# Patient Record
Sex: MALE | Race: WHITE | NOT HISPANIC OR LATINO | Employment: OTHER | URBAN - METROPOLITAN AREA
[De-identification: names, ages, dates, MRNs, and addresses within clinical notes are randomized per-mention and may not be internally consistent; named-entity substitution may affect disease eponyms.]

---

## 2017-06-13 ENCOUNTER — LAB VISIT (OUTPATIENT)
Dept: LAB | Facility: HOSPITAL | Age: 82
End: 2017-06-13
Attending: INTERNAL MEDICINE
Payer: MEDICARE

## 2017-06-13 DIAGNOSIS — R80.9 PROTEINURIA: ICD-10-CM

## 2017-06-13 DIAGNOSIS — D63.1 ANEMIA IN CHRONIC KIDNEY DISEASE(285.21): ICD-10-CM

## 2017-06-13 DIAGNOSIS — I10 ESSENTIAL HYPERTENSION, MALIGNANT: ICD-10-CM

## 2017-06-13 DIAGNOSIS — N18.9 ANEMIA IN CHRONIC KIDNEY DISEASE(285.21): ICD-10-CM

## 2017-06-13 DIAGNOSIS — E03.9 UNSPECIFIED HYPOTHYROIDISM: ICD-10-CM

## 2017-06-13 DIAGNOSIS — N18.30 CHRONIC KIDNEY DISEASE, STAGE III (MODERATE): Primary | ICD-10-CM

## 2017-06-13 DIAGNOSIS — R31.21 ASYMPTOMATIC MICROSCOPIC HEMATURIA: ICD-10-CM

## 2017-06-13 DIAGNOSIS — E87.20 ACIDOSIS: ICD-10-CM

## 2017-06-13 DIAGNOSIS — N18.30 CHRONIC KIDNEY DISEASE, STAGE III (MODERATE): ICD-10-CM

## 2017-06-13 LAB
ALBUMIN SERPL BCP-MCNC: 3.7 G/DL
ALP SERPL-CCNC: 75 U/L
ALT SERPL W/O P-5'-P-CCNC: 16 U/L
AMORPH CRY URNS QL MICRO: ABNORMAL
ANION GAP SERPL CALC-SCNC: 9 MMOL/L
AST SERPL-CCNC: 21 U/L
BACTERIA #/AREA URNS HPF: ABNORMAL /HPF
BASOPHILS # BLD AUTO: 0.05 K/UL
BASOPHILS NFR BLD: 0.6 %
BILIRUB SERPL-MCNC: 0.8 MG/DL
BILIRUB UR QL STRIP: NEGATIVE
BUN SERPL-MCNC: 33 MG/DL
CALCIUM SERPL-MCNC: 9.3 MG/DL
CHLORIDE SERPL-SCNC: 107 MMOL/L
CLARITY UR: CLEAR
CO2 SERPL-SCNC: 23 MMOL/L
COLOR UR: YELLOW
CREAT SERPL-MCNC: 1.7 MG/DL
CREAT UR-MCNC: 111 MG/DL
DIFFERENTIAL METHOD: ABNORMAL
EOSINOPHIL # BLD AUTO: 0.2 K/UL
EOSINOPHIL NFR BLD: 3 %
ERYTHROCYTE [DISTWIDTH] IN BLOOD BY AUTOMATED COUNT: 14.1 %
EST. GFR  (AFRICAN AMERICAN): 41.3 ML/MIN/1.73 M^2
EST. GFR  (NON AFRICAN AMERICAN): 35.7 ML/MIN/1.73 M^2
FERRITIN SERPL-MCNC: 862 NG/ML
GLUCOSE SERPL-MCNC: 145 MG/DL
GLUCOSE UR QL STRIP: NEGATIVE
HCT VFR BLD AUTO: 41.3 %
HGB BLD-MCNC: 13.6 G/DL
HGB UR QL STRIP: NEGATIVE
HYALINE CASTS #/AREA URNS LPF: 2 /LPF
IRON SERPL-MCNC: 64 UG/DL
KETONES UR QL STRIP: NEGATIVE
LEUKOCYTE ESTERASE UR QL STRIP: NEGATIVE
LYMPHOCYTES # BLD AUTO: 2.9 K/UL
LYMPHOCYTES NFR BLD: 35.3 %
MCH RBC QN AUTO: 30.2 PG
MCHC RBC AUTO-ENTMCNC: 32.9 %
MCV RBC AUTO: 92 FL
MICROSCOPIC COMMENT: ABNORMAL
MONOCYTES # BLD AUTO: 0.7 K/UL
MONOCYTES NFR BLD: 8.2 %
NEUTROPHILS # BLD AUTO: 4.2 K/UL
NEUTROPHILS NFR BLD: 52.4 %
NITRITE UR QL STRIP: NEGATIVE
PH UR STRIP: 6 [PH] (ref 5–8)
PHOSPHATE SERPL-MCNC: 3.3 MG/DL
PLATELET # BLD AUTO: 179 K/UL
PMV BLD AUTO: 12 FL
POTASSIUM SERPL-SCNC: 4.8 MMOL/L
PROT SERPL-MCNC: 7.5 G/DL
PROT UR QL STRIP: ABNORMAL
PROT UR-MCNC: 76 MG/DL
PROT/CREAT RATIO, UR: 0.68
PTH-INTACT SERPL-MCNC: 80 PG/ML
RBC # BLD AUTO: 4.51 M/UL
RBC #/AREA URNS HPF: 3 /HPF (ref 0–4)
SATURATED IRON: 21 %
SODIUM SERPL-SCNC: 139 MMOL/L
SP GR UR STRIP: 1.01 (ref 1–1.03)
SQUAMOUS #/AREA URNS HPF: 2 /HPF
TOTAL IRON BINDING CAPACITY: 305 UG/DL
TRANSFERRIN SERPL-MCNC: 206 MG/DL
URN SPEC COLLECT METH UR: ABNORMAL
WBC # BLD AUTO: 8.08 K/UL
WBC #/AREA URNS HPF: 1 /HPF (ref 0–5)

## 2017-06-13 PROCEDURE — 36415 COLL VENOUS BLD VENIPUNCTURE: CPT | Mod: PO

## 2017-06-13 PROCEDURE — 83970 ASSAY OF PARATHORMONE: CPT

## 2017-06-13 PROCEDURE — 82570 ASSAY OF URINE CREATININE: CPT

## 2017-06-13 PROCEDURE — 81000 URINALYSIS NONAUTO W/SCOPE: CPT | Mod: PO

## 2017-06-13 PROCEDURE — 82728 ASSAY OF FERRITIN: CPT

## 2017-06-13 PROCEDURE — 83540 ASSAY OF IRON: CPT

## 2017-06-13 PROCEDURE — 84100 ASSAY OF PHOSPHORUS: CPT

## 2017-06-13 PROCEDURE — 85025 COMPLETE CBC W/AUTO DIFF WBC: CPT

## 2017-06-13 PROCEDURE — 80053 COMPREHEN METABOLIC PANEL: CPT

## 2019-01-22 ENCOUNTER — OFFICE VISIT (OUTPATIENT)
Dept: VASCULAR SURGERY | Facility: CLINIC | Age: 84
End: 2019-01-22
Payer: MEDICARE

## 2019-01-22 VITALS
BODY MASS INDEX: 32.13 KG/M2 | SYSTOLIC BLOOD PRESSURE: 178 MMHG | DIASTOLIC BLOOD PRESSURE: 81 MMHG | HEIGHT: 68 IN | HEART RATE: 66 BPM | WEIGHT: 212 LBS

## 2019-01-22 DIAGNOSIS — R09.89 LEFT CAROTID BRUIT: ICD-10-CM

## 2019-01-22 DIAGNOSIS — I65.23 CAROTID STENOSIS, BILATERAL: ICD-10-CM

## 2019-01-22 PROCEDURE — 99205 PR OFFICE/OUTPT VISIT, NEW, LEVL V, 60-74 MIN: ICD-10-PCS | Mod: S$PBB,,, | Performed by: THORACIC SURGERY (CARDIOTHORACIC VASCULAR SURGERY)

## 2019-01-22 PROCEDURE — 99205 OFFICE O/P NEW HI 60 MIN: CPT | Mod: S$PBB,,, | Performed by: THORACIC SURGERY (CARDIOTHORACIC VASCULAR SURGERY)

## 2019-01-22 PROCEDURE — 99999 PR PBB SHADOW E&M-EST. PATIENT-LVL III: ICD-10-PCS | Mod: PBBFAC,,, | Performed by: THORACIC SURGERY (CARDIOTHORACIC VASCULAR SURGERY)

## 2019-01-22 PROCEDURE — 99999 PR PBB SHADOW E&M-EST. PATIENT-LVL III: CPT | Mod: PBBFAC,,, | Performed by: THORACIC SURGERY (CARDIOTHORACIC VASCULAR SURGERY)

## 2019-01-22 PROCEDURE — 99213 OFFICE O/P EST LOW 20 MIN: CPT | Mod: PBBFAC,PO | Performed by: THORACIC SURGERY (CARDIOTHORACIC VASCULAR SURGERY)

## 2019-01-27 PROBLEM — R09.89 LEFT CAROTID BRUIT: Status: ACTIVE | Noted: 2019-01-27

## 2019-01-27 PROBLEM — I65.23 CAROTID STENOSIS, BILATERAL: Status: ACTIVE | Noted: 2019-01-27

## 2019-01-27 NOTE — PROGRESS NOTES
CHIEF COMPLAINT:   Chief Complaint   Patient presents with    Carotid Artery Disease     Mauro:Carotid Stenosis    Coronary Artery Disease       REFERRING PROVIDER: Self, Aaareferral    Reason for consult: Evaluation of carotid artery disease    Subjective:     Patient is a 87 y.o. male who is followed by his primary care physician and noted to have a carotid bruit. Carotid ultrasound was performed with abnormal findings. Patient denies stroke, amaurosis fugax, or lateralizing neurologic complaints.    Patient Active Problem List    Diagnosis Date Noted    Atrial fibrillation 11/08/2013    Hx of CABG 11/08/2013    PVD (peripheral vascular disease) 03/27/2013    Lumbar spinal stenosis     Diabetes mellitus, type 2     HTN (hypertension)     Renal insufficiency     CAD (coronary artery disease)     HLD (hyperlipidemia)     Hypothyroidism      Past Medical History:   Diagnosis Date    CAD (coronary artery disease)     Carotid artery occlusion     Diabetes mellitus type II     Gout     HLD (hyperlipidemia)     HTN (hypertension)     Hypothyroidism     Lumbar spinal stenosis     Renal insufficiency       Past Surgical History:   Procedure Laterality Date    CARDIAC CATHETERIZATION      CORONARY ARTERY BYPASS GRAFT  09/29/2005    JOINT REPLACEMENT      knee    SPINE SURGERY      stents           Medication List           Accurate as of 1/22/19 11:59 PM. If you have any questions, ask your nurse or doctor.               CONTINUE taking these medications    amLODIPine 10 MG tablet  Commonly known as:  NORVASC     aspirin 81 MG EC tablet  Commonly known as:  ECOTRIN     carvedilol 3.125 MG tablet  Commonly known as:  COREG     ELIQUIS 2.5 mg Tab  Generic drug:  apixaban     glipiZIDE 5 MG tablet  Commonly known as:  GLUCOTROL  Take 1 tablet (5 mg total) by mouth 2 (two) times daily.     hydroCHLOROthiazide 25 MG tablet  Commonly known as:  HYDRODIURIL     levothyroxine 100 MCG tablet  Commonly  "known as:  SYNTHROID  TAKE 1 TABLET BY MOUTH EVERY DAY     losartan 100 MG tablet  Commonly known as:  COZAAR     pantoprazole 40 MG tablet  Commonly known as:  PROTONIX     prednisoLONE acetate 1 % Drps  Commonly known as:  PRED FORTE     VITAMIN D-3 ORAL     ZETIA 10 mg tablet  Generic drug:  ezetimibe  TAKE 1 TABLET BY MOUTH EVERY DAY          Review of patient's allergies indicates:   Allergen Reactions    No known drug allergies       Social History     Tobacco Use    Smoking status: Never Smoker    Smokeless tobacco: Former User     Types: Chew   Substance Use Topics    Alcohol use: Yes     Comment: ocasionally      Family History   Problem Relation Age of Onset    Heart disease Mother     Hypertension Son     Aneurysm Brother     Diabetes Son       Review of Systems  Gen.: No fevers, chills, night sweats, weight changes.  HEENT: No new visual field changes or hoarseness. No amaurosis fugax  Neck: No complaints.  Respiratory: No significant shortness of breath, cough, hemoptysis.  Heart: No angina or palpitations.  GI: patient melena.  : No dysuria.  Skin: No rash.  Fascial: No claudication.  Neurologic: No lateralizing complaints.    Objective: Physical exam      Vitals:    01/22/19 1326   BP: (!) 178/81   Pulse: 66   Weight: 96.2 kg (212 lb)   Height: 5' 8" (1.727 m)   PainSc: 0-No pain       General: Heavyset, pleasant man in no obvious distress.  HEENT: Normocephalic, atraumatic. There is good facial symmetry. Tongue protrudes in the midline.  Neck: Trachea is midline. There is a soft left carotid bruit.  Chest: Median sternotomy scar is present.  Heart: Regular rate and rhythm.  Lungs: Clear bilaterally.  Abdomen: Normal bowel sounds.  Extremities: No cyanosis or clubbing.  Vascular: 2+ radial and carotid bilaterally.  Neurologic: Alert and oriented ×4 with no focal deficits.    Data Review:   Lab Results   Component Value Date    WBC 8.08 06/13/2017    HGB 13.6 (L) 06/13/2017    HCT 41.3 " 06/13/2017    MCV 92 06/13/2017     06/13/2017   ,   BMP   Lab Results   Component Value Date     06/13/2017    K 4.8 06/13/2017     06/13/2017    CO2 23 06/13/2017    BUN 33 (H) 06/13/2017    CREATININE 1.7 (H) 06/13/2017    CALCIUM 9.3 06/13/2017    ANIONGAP 9 06/13/2017    ESTGFRAFRICA 41.3 (A) 06/13/2017    EGFRNONAA 35.7 (A) 06/13/2017   ,   CMP  Sodium   Date Value Ref Range Status   06/13/2017 139 136 - 145 mmol/L Final     Potassium   Date Value Ref Range Status   06/13/2017 4.8 3.5 - 5.1 mmol/L Final     Chloride   Date Value Ref Range Status   06/13/2017 107 95 - 110 mmol/L Final     CO2   Date Value Ref Range Status   06/13/2017 23 23 - 29 mmol/L Final     Glucose   Date Value Ref Range Status   06/13/2017 145 (H) 70 - 110 mg/dL Final     BUN, Bld   Date Value Ref Range Status   06/13/2017 33 (H) 8 - 23 mg/dL Final     Creatinine   Date Value Ref Range Status   06/13/2017 1.7 (H) 0.5 - 1.4 mg/dL Final     Calcium   Date Value Ref Range Status   06/13/2017 9.3 8.7 - 10.5 mg/dL Final     Total Protein   Date Value Ref Range Status   06/13/2017 7.5 6.0 - 8.4 g/dL Final     Albumin   Date Value Ref Range Status   06/13/2017 3.7 3.5 - 5.2 g/dL Final     Total Bilirubin   Date Value Ref Range Status   06/13/2017 0.8 0.1 - 1.0 mg/dL Final     Comment:     For infants and newborns, interpretation of results should be based  on gestational age, weight and in agreement with clinical  observations.  Premature Infant recommended reference ranges:  Up to 24 hours.............<8.0 mg/dL  Up to 48 hours............<12.0 mg/dL  3-5 days..................<15.0 mg/dL  6-29 days.................<15.0 mg/dL       Alkaline Phosphatase   Date Value Ref Range Status   06/13/2017 75 55 - 135 U/L Final     AST   Date Value Ref Range Status   06/13/2017 21 10 - 40 U/L Final     ALT   Date Value Ref Range Status   06/13/2017 16 10 - 44 U/L Final     Anion Gap   Date Value Ref Range Status   06/13/2017 9 8 - 16  mmol/L Final     eGFR if    Date Value Ref Range Status   06/13/2017 41.3 (A) >60 mL/min/1.73 m^2 Final     eGFR if non    Date Value Ref Range Status   06/13/2017 35.7 (A) >60 mL/min/1.73 m^2 Final     Comment:     Calculation used to obtain the estimated glomerular filtration  rate (eGFR) is the CKD-EPI equation. Since race is unknown   in our information system, the eGFR values for   -American and Non--American patients are given   for each creatinine result.     ,   Lab Results   Component Value Date    INR 1.1 08/22/2012    INR 1.1 12/10/2010    INR 1.0 11/10/2010       EKG:      Chest X-Ray: No results found for this or any previous visit.    CT Scan: No results found for this or any previous visit.      Carotid ultrasound: [facility].  1/4/2019.  Right carotid findings:  There is moderate to severe atheromatous plaquing which shadowing from calcified plaque.  Proximal internal carotid peak systolic velocity 111.3. Peak systolic ratio 1.9.  Left carotid findings:  There is moderate to severe atheromatous plaquing with shadowing from calcified plaque.  Proximal internal carotid artery peak systolic velocity 161 with a peak systolic ratio 1.7.    Impression: Bilateral moderate to severe carotid atheromatous plaquing. There is less than 50% diameter reduction of the right side. There is 50-69% diameter reduction of the left side.        Assessment:     1. Moderate left internal carotid artery stenosis, asymptomatic.  2. Left carotid bruit related to above.  3. History coronary artery disease with previous coronary artery bypass.    Plan:     Recommend that the patient continue current medical management.  He should follow in 6 months with carotid ultrasound.  Should he develop any symptoms of amaurosis fugax or lateralizing neurologic complaints, he will notify me immediately, and CT angiography of the carotids will be performed.

## 2019-07-15 DIAGNOSIS — R09.89 LEFT CAROTID BRUIT: ICD-10-CM

## 2019-07-15 DIAGNOSIS — I65.23 CAROTID STENOSIS, BILATERAL: Primary | ICD-10-CM

## 2020-02-19 ENCOUNTER — TELEPHONE (OUTPATIENT)
Dept: RADIOLOGY | Facility: HOSPITAL | Age: 85
End: 2020-02-19

## 2020-02-20 ENCOUNTER — HOSPITAL ENCOUNTER (OUTPATIENT)
Dept: RADIOLOGY | Facility: HOSPITAL | Age: 85
Discharge: HOME | End: 2020-02-20
Attending: THORACIC SURGERY (CARDIOTHORACIC VASCULAR SURGERY)
Payer: MEDICARE

## 2020-02-20 DIAGNOSIS — I65.23 CAROTID STENOSIS, BILATERAL: ICD-10-CM

## 2020-02-20 DIAGNOSIS — R09.89 LEFT CAROTID BRUIT: ICD-10-CM

## 2020-02-20 PROCEDURE — 93880 US CAROTID BILATERAL: ICD-10-PCS | Mod: 26,,, | Performed by: RADIOLOGY

## 2020-02-20 PROCEDURE — 93880 EXTRACRANIAL BILAT STUDY: CPT | Mod: 26,,, | Performed by: RADIOLOGY

## 2020-02-20 PROCEDURE — 93880 EXTRACRANIAL BILAT STUDY: CPT | Mod: TC,PO

## 2020-02-28 ENCOUNTER — TELEPHONE (OUTPATIENT)
Dept: VASCULAR SURGERY | Facility: CLINIC | Age: 85
End: 2020-02-28

## 2020-02-28 NOTE — TELEPHONE ENCOUNTER
----- Message from Perla Mead sent at 2/28/2020  2:27 PM CST -----  Contact: Patient  Patient would like to receive a phone call in reference to his Bilateral Carotid Ultrasound results from 02/20/2020.  [facility]

## 2020-03-02 NOTE — TELEPHONE ENCOUNTER
----- Message from Larissa Haque sent at 3/2/2020 11:15 AM CST -----  Contact: Kurt-rema  Type:  Sooner Apoointment Request    Caller is requesting a sooner appointment.  Caller declined first available appointment listed below.  Caller will not accept being placed on the waitlist and is requesting a message be sent to doctor.  Name of Caller:Mayur-son  When is the first available appointment?03/31/2020  Symptoms: US results//face feeling stiff  Would the patient rather a call back or a response via [facility]? call  Best Call Back Number:[phone] or [phone] (Kurt-son)  Additional Information:

## 2020-03-03 ENCOUNTER — OFFICE VISIT (OUTPATIENT)
Dept: CARDIAC SURGERY | Facility: CLINIC | Age: 85
End: 2020-03-03
Payer: MEDICARE

## 2020-03-03 ENCOUNTER — TELEPHONE (OUTPATIENT)
Dept: VASCULAR SURGERY | Facility: CLINIC | Age: 85
End: 2020-03-03

## 2020-03-03 VITALS
DIASTOLIC BLOOD PRESSURE: 73 MMHG | RESPIRATION RATE: 18 BRPM | HEIGHT: 68 IN | HEART RATE: 62 BPM | WEIGHT: 210 LBS | BODY MASS INDEX: 31.83 KG/M2 | SYSTOLIC BLOOD PRESSURE: 177 MMHG

## 2020-03-03 DIAGNOSIS — R09.89 LEFT CAROTID BRUIT: ICD-10-CM

## 2020-03-03 DIAGNOSIS — I65.23 CAROTID STENOSIS, BILATERAL: Primary | ICD-10-CM

## 2020-03-03 PROCEDURE — 99214 OFFICE O/P EST MOD 30 MIN: CPT | Mod: PBBFAC,PO | Performed by: THORACIC SURGERY (CARDIOTHORACIC VASCULAR SURGERY)

## 2020-03-03 PROCEDURE — 99213 OFFICE O/P EST LOW 20 MIN: CPT | Mod: S$PBB,,, | Performed by: THORACIC SURGERY (CARDIOTHORACIC VASCULAR SURGERY)

## 2020-03-03 PROCEDURE — 99213 PR OFFICE/OUTPT VISIT, EST, LEVL III, 20-29 MIN: ICD-10-PCS | Mod: S$PBB,,, | Performed by: THORACIC SURGERY (CARDIOTHORACIC VASCULAR SURGERY)

## 2020-03-03 PROCEDURE — 99999 PR PBB SHADOW E&M-EST. PATIENT-LVL IV: CPT | Mod: PBBFAC,,, | Performed by: THORACIC SURGERY (CARDIOTHORACIC VASCULAR SURGERY)

## 2020-03-03 PROCEDURE — 99999 PR PBB SHADOW E&M-EST. PATIENT-LVL IV: ICD-10-PCS | Mod: PBBFAC,,, | Performed by: THORACIC SURGERY (CARDIOTHORACIC VASCULAR SURGERY)

## 2020-03-03 NOTE — PROGRESS NOTES
CHIEF COMPLAINT:   Chief Complaint   Patient presents with    Carotid Artery Disease         History of Present Illness     Patient is a 88 y.o. male with history of asymptomatic left carotid stenosis.  He had a recent ultrasound, and he notices that both sides of his face are numb after he awakens from a nap.  This lasts for approximately 10 min and then resolves.  He has no lateralizing neurologic complaints.    Patient Active Problem List    Diagnosis Date Noted    Carotid stenosis, bilateral 01/27/2019    Left carotid bruit 01/27/2019    Atrial fibrillation 11/08/2013    Hx of CABG 11/08/2013    PVD (peripheral vascular disease) 03/27/2013    Lumbar spinal stenosis     Diabetes mellitus, type 2     HTN (hypertension)     Renal insufficiency     CAD (coronary artery disease)     HLD (hyperlipidemia)     Hypothyroidism      Past Medical History:   Diagnosis Date    CAD (coronary artery disease)     Carotid artery occlusion     Carotid stenosis, bilateral 1/27/2019    Diabetes mellitus type II     Gout     HLD (hyperlipidemia)     HTN (hypertension)     Hypothyroidism     Left carotid bruit 1/27/2019    Lumbar spinal stenosis     Renal insufficiency       Past Surgical History:   Procedure Laterality Date    CARDIAC CATHETERIZATION      CORONARY ARTERY BYPASS GRAFT  09/29/2005    JOINT REPLACEMENT      knee    SPINE SURGERY      stents        Medication List with Changes/Refills   Current Medications    AMLODIPINE (NORVASC) 10 MG TABLET    Take 10 mg by mouth once daily. 1 tablet Oral Every evening    APIXABAN (ELIQUIS) 2.5 MG TAB    Take 2.5 mg by mouth 2 (two) times daily.    ASPIRIN (ECOTRIN) 81 MG EC TABLET    Take 81 mg by mouth once daily.    CALCIUM CARBONATE/VITAMIN D3 (VITAMIN D-3 ORAL)    Take by mouth.    CARVEDILOL (COREG) 3.125 MG TABLET    Take 3.125 mg by mouth 2 (two) times daily with meals.    GLIPIZIDE (GLUCOTROL) 5 MG TABLET    Take 1 tablet (5 mg total) by mouth 2  "(two) times daily.    HYDROCHLOROTHIAZIDE (HYDRODIURIL) 25 MG TABLET    Take 25 mg by mouth once daily.    LEVOTHYROXINE (SYNTHROID) 100 MCG TABLET    TAKE 1 TABLET BY MOUTH EVERY DAY    LOSARTAN (COZAAR) 100 MG TABLET    Take 100 mg by mouth once daily.    PANTOPRAZOLE (PROTONIX) 40 MG TABLET    Take 40 mg by mouth once daily.    PREDNISOLONE ACETATE (PRED FORTE) 1 % DRPS    1 drop 2 (two) times daily.    ZETIA 10 MG TABLET    TAKE 1 TABLET BY MOUTH EVERY DAY     Review of patient's allergies indicates:   Allergen Reactions    No known drug allergies       Social History     Tobacco Use    Smoking status: Never Smoker    Smokeless tobacco: Former User     Types: Chew   Substance Use Topics    Alcohol use: Yes     Comment: ocasionally      Family History   Problem Relation Age of Onset    Heart disease Mother     Hypertension Son     Aneurysm Brother     Diabetes Son       Review of Systems  General:  No fevers, chills, night sweats, weight changes.  HEENT:  Patient developed some numbness in both sides of his face after he awakens from a nap.  This has occurred since his recent carotid ultrasound.  There are no lateralizing complaints.  He has no speech difficulty or vision difficulty.  Respiratory:  No shortness of breath.  Cardiac:  Possibly no chest pain although he does have burning pain in the chest at night that considers secondary to reflux disease.  GI:  Possible gastroesophageal reflux symptoms.  Neurologic:  Numbness in his face after a nap.  No lateralizing complaints.    Objective: Physical Exam     Vitals:    03/03/20 1616   BP: (!) 177/73   Pulse: 62   Resp: 18   Weight: 95.3 kg (210 lb)   Height: 5' 8" (1.727 m)   PainSc:   5   PainLoc: Generalized       Objective    General:  Well-nourished, well-developed man in no obvious distress.  HEENT:  Normocephalic, atraumatic.  There is good facial symmetry.  Tongue protrudes in midline.  Neck:  There is a left carotid bruit.  Chest:  Lungs are " clear bilaterally.  Heart:  Regular rate and rhythm.  Extremities:  No significant edema.  Neurologic:  Alert oriented x4 no focal deficits.    Data Review:   Lab Results   Component Value Date    WBC 8.08 06/13/2017    HGB 13.6 (L) 06/13/2017    HCT 41.3 06/13/2017    MCV 92 06/13/2017     06/13/2017   ,   BMP   Lab Results   Component Value Date     06/13/2017    K 4.8 06/13/2017     06/13/2017    CO2 23 06/13/2017    BUN 33 (H) 06/13/2017    CREATININE 1.7 (H) 06/13/2017    CALCIUM 9.3 06/13/2017    ANIONGAP 9 06/13/2017    ESTGFRAFRICA 41.3 (A) 06/13/2017    EGFRNONAA 35.7 (A) 06/13/2017   ,   CMP  Sodium   Date Value Ref Range Status   06/13/2017 139 136 - 145 mmol/L Final     Potassium   Date Value Ref Range Status   06/13/2017 4.8 3.5 - 5.1 mmol/L Final     Chloride   Date Value Ref Range Status   06/13/2017 107 95 - 110 mmol/L Final     CO2   Date Value Ref Range Status   06/13/2017 23 23 - 29 mmol/L Final     Glucose   Date Value Ref Range Status   06/13/2017 145 (H) 70 - 110 mg/dL Final     BUN, Bld   Date Value Ref Range Status   06/13/2017 33 (H) 8 - 23 mg/dL Final     Creatinine   Date Value Ref Range Status   06/13/2017 1.7 (H) 0.5 - 1.4 mg/dL Final     Calcium   Date Value Ref Range Status   06/13/2017 9.3 8.7 - 10.5 mg/dL Final     Total Protein   Date Value Ref Range Status   06/13/2017 7.5 6.0 - 8.4 g/dL Final     Albumin   Date Value Ref Range Status   06/13/2017 3.7 3.5 - 5.2 g/dL Final     Total Bilirubin   Date Value Ref Range Status   06/13/2017 0.8 0.1 - 1.0 mg/dL Final     Comment:     For infants and newborns, interpretation of results should be based  on gestational age, weight and in agreement with clinical  observations.  Premature Infant recommended reference ranges:  Up to 24 hours.............<8.0 mg/dL  Up to 48 hours............<12.0 mg/dL  3-5 days..................<15.0 mg/dL  6-29 days.................<15.0 mg/dL       Alkaline Phosphatase   Date Value Ref  Range Status   06/13/2017 75 55 - 135 U/L Final     AST   Date Value Ref Range Status   06/13/2017 21 10 - 40 U/L Final     ALT   Date Value Ref Range Status   06/13/2017 16 10 - 44 U/L Final     Anion Gap   Date Value Ref Range Status   06/13/2017 9 8 - 16 mmol/L Final     eGFR if    Date Value Ref Range Status   06/13/2017 41.3 (A) >60 mL/min/1.73 m^2 Final     eGFR if non    Date Value Ref Range Status   06/13/2017 35.7 (A) >60 mL/min/1.73 m^2 Final     Comment:     Calculation used to obtain the estimated glomerular filtration  rate (eGFR) is the CKD-EPI equation. Since race is unknown   in our information system, the eGFR values for   -American and Non--American patients are given   for each creatinine result.     ,   Lab Results   Component Value Date    INR 1.1 08/22/2012    INR 1.1 12/10/2010    INR 1.0 11/10/2010       US Carotid Bilateral1/4/2019  [facility]  Result Narrative   REASON FOR EXAM: [R09.89]-Other specified symptoms and signs involving the circulatory and respiratory systems      TECHNICAL FACTORS: Using a linear high-frequency vascular ultrasound transducer, transverse and longitudinal gray-scale images are obtained of the extracranial carotid system. B-mode, color Doppler and spectral Doppler images of the CCA, ICA, ECA, and   vertebral arteries are included.    TECHNOLOGIST: Jeramy Vasques    COMPARISON: None    RIGHT CAROTID FINDINGS:  There is moderate to severe atheromatous plaquing with shadowing from calcified plaque.  ICA Prox PS: 111.31 cm/s   ICA Prox ED: 11.15 cm/s   ICA Mid PS: 86.03 cm/s   ICA Mid ED: 11.82 cm/s   ICA Dist PS: 78.19 cm/s   ICA Dist ED: 9.48 cm/s   CCA Mid PS: 58.18 cm/s   CCA Mid ED: 9.79 cm/s   ECA PS: 247.61 cm/s  Vert PS: 44.41 cm/s  Antegrade flow.   ICA/CCA PS Ratio: 1.9     LEFT CAROTID FINDINGS:  There is moderate to severe atheromatous plaquing with shadowing from calcified plaque.  ICA Prox  PS: 161.30 cm/s   ICA Prox ED: 15.84 cm/s   ICA Mid PS: 87.87 cm/s   ICA Mid ED: 14.15 cm/s   ICA Dist PS: 90.80 cm/s   ICA Dist ED: 5.76 cm/s   CCA Mid PS: 93.95 cm/s   CCA Mid ED: 8.54 cm/s   ECA PS: 283.16 cm/s  Vert PS: 46.69 cm/s  Antegrade flow.   ICA/CCA PS Ratio: 1.7     Comparison of the proximal internal carotid artery diameter to the distal internal carotid artery diameter bilaterally was performed.     IMPRESSION:  Bilateral moderate to severe carotid atheromatous plaquing. There is less than 50% diameter reduction of the right side. There is 50-69% diameter reduction of the left side.    RECOMMENDATIONS: Due to dense plaque calcification consider correlation with CTA of the carotids.      Electronically signed by Mayur Fish MD on 1/4/2019 3:27 PM       US CAROTID BILATERAL  [facility]  Date: 02/20/2020    CLINICAL HISTORY:  Occlusion and stenosis of bilateral carotid arteries    TECHNIQUE:  Real-time grayscale, color and duplex doppler ultrasound examination of the carotid and vertebral artery systems bilaterally.  Stenosis as determined by the Society of Radiologists in Ultrasound consensus.    COMPARISON:  None.    FINDINGS:  The following velocities represent peak systolic velocity and will be measured in cm/sec.    Right:    CCA: 86    ICA: 122    IC/CC ratio: 1.4.    Vertebral artery: Antegrade flow and normal waveform.    There is moderate plaque noted in the region of the right ICA origin/carotid bulb. Velocities suggest a less than 50% narrowing.    Left:    CCA: 114 cm/sec    ICA: 166    IC/CC ratio: 1.5.    Vertebral artery: Antegrade flow and normal waveform.    There is moderate plaque noted in the region of the left ICA origin/carotid bulb. The degree of stenosis is on the order of 50-69%.      Impression       1. Findings suggesting hemodynamically significant stenosis on the left.  .      Electronically signed by: Miguel Cook DO  Date: 02/20/2020  Time: 15:09         Assessment:      1.  Moderate, asymptomatic left internal carotid artery stenosis that has not changed over the last year.  2.  Numbness in his face bilaterally after he awakened from a nap.  I do not believe this was related to his carotid artery disease.  3.  Patient has stopped taking Zantac for his heartburn and remains with heartburn despite taking Prilosec once daily.    Plan:     1.  No intervention is required for his carotid disease.  He should follow in 1 year with carotid ultrasound.  2.  I have recommended the patient take his Prilosec twice daily for 1 week.  If his heartburn symptoms do not resolve, he should notify Dr. Vila, his primary care physician, immediately as his complaints may be related to cardiac issues rather than gastric.

## 2020-03-03 NOTE — TELEPHONE ENCOUNTER
----- Message from Thu Cornejo sent at 3/3/2020 11:09 AM CST -----  Contact: pt  Type:  Patient Returning Call    Who Called:pt  Who Left Message for Patient:nurse  Does the patient know what this is regarding?:  Would the patient rather a call back or a response via [facility]? Call back  Best Call Back Number: [phone]  Additional Information:

## 2021-04-29 ENCOUNTER — TELEPHONE (OUTPATIENT)
Dept: DERMATOLOGY | Facility: CLINIC | Age: 86
End: 2021-04-29

## 2021-05-12 ENCOUNTER — OFFICE VISIT (OUTPATIENT)
Dept: DERMATOLOGY | Facility: CLINIC | Age: 86
End: 2021-05-12
Payer: MEDICARE

## 2021-05-12 VITALS
SYSTOLIC BLOOD PRESSURE: 153 MMHG | BODY MASS INDEX: 30.31 KG/M2 | DIASTOLIC BLOOD PRESSURE: 69 MMHG | HEART RATE: 65 BPM | WEIGHT: 200 LBS | HEIGHT: 68 IN

## 2021-05-12 DIAGNOSIS — C44.111 BASAL CELL CARCINOMA, EYELID, RIGHT: ICD-10-CM

## 2021-05-12 DIAGNOSIS — C44.222 SQUAMOUS CELL CANCER OF EXTERNAL EAR, RIGHT: Primary | ICD-10-CM

## 2021-05-12 PROCEDURE — 99203 PR OFFICE/OUTPT VISIT, NEW, LEVL III, 30-44 MIN: ICD-10-PCS | Mod: S$PBB,,, | Performed by: DERMATOLOGY

## 2021-05-12 PROCEDURE — 99999 PR PBB SHADOW E&M-EST. PATIENT-LVL IV: ICD-10-PCS | Mod: PBBFAC,,, | Performed by: DERMATOLOGY

## 2021-05-12 PROCEDURE — 99214 OFFICE O/P EST MOD 30 MIN: CPT | Mod: PBBFAC,PO | Performed by: DERMATOLOGY

## 2021-05-12 PROCEDURE — 99999 PR PBB SHADOW E&M-EST. PATIENT-LVL IV: CPT | Mod: PBBFAC,,, | Performed by: DERMATOLOGY

## 2021-05-12 PROCEDURE — 99203 OFFICE O/P NEW LOW 30 MIN: CPT | Mod: S$PBB,,, | Performed by: DERMATOLOGY

## 2021-05-12 RX ORDER — ISOSORBIDE MONONITRATE 30 MG/1
30 TABLET, EXTENDED RELEASE ORAL DAILY
COMMUNITY

## 2021-05-12 RX ORDER — CLOPIDOGREL BISULFATE 75 MG/1
75 TABLET ORAL DAILY
COMMUNITY

## 2021-05-12 RX ORDER — NIFEDIPINE 60 MG/1
30 TABLET, EXTENDED RELEASE ORAL DAILY
COMMUNITY

## 2021-05-12 RX ORDER — SACUBITRIL AND VALSARTAN 24; 26 MG/1; MG/1
1 TABLET, FILM COATED ORAL 2 TIMES DAILY
COMMUNITY

## 2021-05-12 RX ORDER — TORSEMIDE 20 MG/1
20 TABLET ORAL DAILY
COMMUNITY

## 2021-05-12 RX ORDER — LANOLIN ALCOHOL/MO/W.PET/CERES
400 CREAM (GRAM) TOPICAL DAILY
COMMUNITY

## 2021-06-08 ENCOUNTER — PROCEDURE VISIT (OUTPATIENT)
Dept: DERMATOLOGY | Facility: CLINIC | Age: 86
End: 2021-06-08
Payer: MEDICARE

## 2021-06-08 VITALS
SYSTOLIC BLOOD PRESSURE: 135 MMHG | HEART RATE: 71 BPM | BODY MASS INDEX: 30.31 KG/M2 | WEIGHT: 200 LBS | HEIGHT: 68 IN | DIASTOLIC BLOOD PRESSURE: 68 MMHG

## 2021-06-08 DIAGNOSIS — C44.222 SQUAMOUS CELL CARCINOMA, EAR, RIGHT: Primary | ICD-10-CM

## 2021-06-08 DIAGNOSIS — D48.5 NEOPLASM OF UNCERTAIN BEHAVIOR OF SKIN: ICD-10-CM

## 2021-06-08 PROCEDURE — 99499 UNLISTED E&M SERVICE: CPT | Mod: S$PBB,,, | Performed by: DERMATOLOGY

## 2021-06-08 PROCEDURE — 11102 TANGNTL BX SKIN SINGLE LES: CPT | Mod: 59,PBBFAC,PO | Performed by: DERMATOLOGY

## 2021-06-08 PROCEDURE — 17312 MOHS ADDL STAGE: CPT | Mod: S$PBB,,, | Performed by: DERMATOLOGY

## 2021-06-08 PROCEDURE — 99499 NO LOS: ICD-10-PCS | Mod: S$PBB,,, | Performed by: DERMATOLOGY

## 2021-06-08 PROCEDURE — 11103 PR TANGENTIAL BIOPSY, SKIN, EA ADDTL LESION: ICD-10-PCS | Mod: S$PBB,,, | Performed by: DERMATOLOGY

## 2021-06-08 PROCEDURE — 88305 TISSUE EXAM BY PATHOLOGIST: ICD-10-PCS | Mod: 26,,, | Performed by: PATHOLOGY

## 2021-06-08 PROCEDURE — 11102 PR TANGENTIAL BIOPSY, SKIN, SINGLE LESION: ICD-10-PCS | Mod: S$PBB,59,, | Performed by: DERMATOLOGY

## 2021-06-08 PROCEDURE — 88342 IMHCHEM/IMCYTCHM 1ST ANTB: CPT | Mod: 26,,, | Performed by: PATHOLOGY

## 2021-06-08 PROCEDURE — 88342 CHG IMMUNOCYTOCHEMISTRY: ICD-10-PCS | Mod: 26,,, | Performed by: PATHOLOGY

## 2021-06-08 PROCEDURE — 15260 PR FULL THICK GRFT NOS,EAR,LID <20 SQCM: ICD-10-PCS | Mod: S$PBB,,, | Performed by: DERMATOLOGY

## 2021-06-08 PROCEDURE — 88305 TISSUE EXAM BY PATHOLOGIST: CPT | Mod: 26,,, | Performed by: PATHOLOGY

## 2021-06-08 PROCEDURE — 17311 MOHS 1 STAGE H/N/HF/G: CPT | Mod: S$PBB,,, | Performed by: DERMATOLOGY

## 2021-06-08 PROCEDURE — 11106 INCAL BX SKN SINGLE LES: CPT | Mod: PBBFAC,PO | Performed by: DERMATOLOGY

## 2021-06-08 PROCEDURE — 15260 FTH/GFT FR N/E/E/L 20 SQCM/<: CPT | Mod: S$PBB,,, | Performed by: DERMATOLOGY

## 2021-06-08 PROCEDURE — 17311 MOHS 1 STAGE H/N/HF/G: CPT | Mod: PBBFAC,PO,59 | Performed by: DERMATOLOGY

## 2021-06-08 PROCEDURE — 17312: ICD-10-PCS | Mod: S$PBB,,, | Performed by: DERMATOLOGY

## 2021-06-08 PROCEDURE — 11103 TANGNTL BX SKIN EA SEP/ADDL: CPT | Mod: S$PBB,,, | Performed by: DERMATOLOGY

## 2021-06-08 PROCEDURE — 11102 TANGNTL BX SKIN SINGLE LES: CPT | Mod: S$PBB,59,, | Performed by: DERMATOLOGY

## 2021-06-08 PROCEDURE — 15260 FTH/GFT FR N/E/E/L 20 SQCM/<: CPT | Mod: PBBFAC,PO,59 | Performed by: DERMATOLOGY

## 2021-06-08 PROCEDURE — 88342 IMHCHEM/IMCYTCHM 1ST ANTB: CPT | Performed by: PATHOLOGY

## 2021-06-08 PROCEDURE — 88305 TISSUE EXAM BY PATHOLOGIST: CPT | Performed by: PATHOLOGY

## 2021-06-08 PROCEDURE — 17312 MOHS ADDL STAGE: CPT | Mod: PBBFAC,PO | Performed by: DERMATOLOGY

## 2021-06-08 PROCEDURE — 17311: ICD-10-PCS | Mod: S$PBB,,, | Performed by: DERMATOLOGY

## 2021-06-15 ENCOUNTER — OFFICE VISIT (OUTPATIENT)
Dept: DERMATOLOGY | Facility: CLINIC | Age: 86
End: 2021-06-15
Payer: MEDICARE

## 2021-06-15 DIAGNOSIS — Z48.02 VISIT FOR SUTURE REMOVAL: Primary | ICD-10-CM

## 2021-06-15 PROCEDURE — 99024 PR POST-OP FOLLOW-UP VISIT: ICD-10-PCS | Mod: POP,,, | Performed by: DERMATOLOGY

## 2021-06-15 PROCEDURE — 99999 PR PBB SHADOW E&M-EST. PATIENT-LVL III: CPT | Mod: PBBFAC,,, | Performed by: DERMATOLOGY

## 2021-06-15 PROCEDURE — 99213 OFFICE O/P EST LOW 20 MIN: CPT | Mod: PBBFAC,PO | Performed by: DERMATOLOGY

## 2021-06-15 PROCEDURE — 99024 POSTOP FOLLOW-UP VISIT: CPT | Mod: POP,,, | Performed by: DERMATOLOGY

## 2021-06-15 PROCEDURE — 99999 PR PBB SHADOW E&M-EST. PATIENT-LVL III: ICD-10-PCS | Mod: PBBFAC,,, | Performed by: DERMATOLOGY

## 2021-06-17 LAB
FINAL PATHOLOGIC DIAGNOSIS: NORMAL
GROSS: NORMAL
Lab: NORMAL
MICROSCOPIC EXAM: NORMAL

## 2021-06-18 ENCOUNTER — TELEPHONE (OUTPATIENT)
Dept: DERMATOLOGY | Facility: CLINIC | Age: 86
End: 2021-06-18

## 2021-06-29 ENCOUNTER — OFFICE VISIT (OUTPATIENT)
Dept: DERMATOLOGY | Facility: CLINIC | Age: 86
End: 2021-06-29
Payer: MEDICARE

## 2021-06-29 DIAGNOSIS — C44.111 BASAL CELL CARCINOMA OF EYELID, RIGHT: Primary | ICD-10-CM

## 2021-06-29 DIAGNOSIS — C44.311 BASAL CELL CARCINOMA OF NOSE: ICD-10-CM

## 2021-06-29 PROCEDURE — 99212 OFFICE O/P EST SF 10 MIN: CPT | Mod: S$PBB,24,, | Performed by: DERMATOLOGY

## 2021-06-29 PROCEDURE — 99999 PR PBB SHADOW E&M-EST. PATIENT-LVL III: ICD-10-PCS | Mod: PBBFAC,,, | Performed by: DERMATOLOGY

## 2021-06-29 PROCEDURE — 99212 PR OFFICE/OUTPT VISIT, EST, LEVL II, 10-19 MIN: ICD-10-PCS | Mod: S$PBB,24,, | Performed by: DERMATOLOGY

## 2021-06-29 PROCEDURE — 99999 PR PBB SHADOW E&M-EST. PATIENT-LVL III: CPT | Mod: PBBFAC,,, | Performed by: DERMATOLOGY

## 2021-06-29 PROCEDURE — 99213 OFFICE O/P EST LOW 20 MIN: CPT | Mod: PBBFAC,PO | Performed by: DERMATOLOGY

## 2021-10-18 ENCOUNTER — PROCEDURE VISIT (OUTPATIENT)
Dept: DERMATOLOGY | Facility: CLINIC | Age: 86
End: 2021-10-18
Payer: MEDICARE

## 2021-10-18 VITALS
HEART RATE: 66 BPM | HEIGHT: 68 IN | DIASTOLIC BLOOD PRESSURE: 72 MMHG | BODY MASS INDEX: 33.34 KG/M2 | SYSTOLIC BLOOD PRESSURE: 158 MMHG | WEIGHT: 220 LBS

## 2021-10-18 DIAGNOSIS — C44.1122 BASAL CELL CARCINOMA (BCC) OF RIGHT LOWER EYELID: ICD-10-CM

## 2021-10-18 DIAGNOSIS — C44.311 BASAL CELL CARCINOMA OF NOSE: Primary | ICD-10-CM

## 2021-10-18 PROCEDURE — 15260 PR FULL THICK GRFT NOS,EAR,LID <20 SQCM: ICD-10-PCS | Mod: S$PBB,,, | Performed by: DERMATOLOGY

## 2021-10-18 PROCEDURE — 17311 MOHS 1 STAGE H/N/HF/G: CPT | Mod: 59,PBBFAC,PO | Performed by: DERMATOLOGY

## 2021-10-18 PROCEDURE — 17312 MOHS ADDL STAGE: CPT | Mod: PBBFAC,PO | Performed by: DERMATOLOGY

## 2021-10-18 PROCEDURE — 15260 FTH/GFT FR N/E/E/L 20 SQCM/<: CPT | Mod: S$PBB,,, | Performed by: DERMATOLOGY

## 2021-10-18 PROCEDURE — 15260 FTH/GFT FR N/E/E/L 20 SQCM/<: CPT | Mod: PBBFAC,PO | Performed by: DERMATOLOGY

## 2021-10-18 PROCEDURE — 17312 MOHS ADDL STAGE: CPT | Mod: S$PBB,,, | Performed by: DERMATOLOGY

## 2021-10-18 PROCEDURE — 99499 NO LOS: ICD-10-PCS | Mod: S$PBB,,, | Performed by: DERMATOLOGY

## 2021-10-18 PROCEDURE — 17311: ICD-10-PCS | Mod: S$PBB,,, | Performed by: DERMATOLOGY

## 2021-10-18 PROCEDURE — 17311 MOHS 1 STAGE H/N/HF/G: CPT | Mod: S$PBB,,, | Performed by: DERMATOLOGY

## 2021-10-18 PROCEDURE — 12053 PR LAYR CLOS WND FACE,FACIAL 5.1-7.5 CM: ICD-10-PCS | Mod: S$PBB,59,, | Performed by: DERMATOLOGY

## 2021-10-18 PROCEDURE — 12053 INTMD RPR FACE/MM 5.1-7.5 CM: CPT | Mod: S$PBB,59,, | Performed by: DERMATOLOGY

## 2021-10-18 PROCEDURE — 99499 UNLISTED E&M SERVICE: CPT | Mod: S$PBB,,, | Performed by: DERMATOLOGY

## 2021-10-18 PROCEDURE — 17312: ICD-10-PCS | Mod: S$PBB,,, | Performed by: DERMATOLOGY

## 2021-10-18 PROCEDURE — 12053 INTMD RPR FACE/MM 5.1-7.5 CM: CPT | Mod: PBBFAC,PO,59 | Performed by: DERMATOLOGY

## 2021-10-25 ENCOUNTER — OFFICE VISIT (OUTPATIENT)
Dept: DERMATOLOGY | Facility: CLINIC | Age: 86
End: 2021-10-25
Payer: MEDICARE

## 2021-10-25 DIAGNOSIS — Z48.02 VISIT FOR SUTURE REMOVAL: Primary | ICD-10-CM

## 2021-10-25 PROCEDURE — 99999 PR PBB SHADOW E&M-EST. PATIENT-LVL III: CPT | Mod: PBBFAC,,, | Performed by: DERMATOLOGY

## 2021-10-25 PROCEDURE — 99024 POSTOP FOLLOW-UP VISIT: CPT | Mod: POP,,, | Performed by: DERMATOLOGY

## 2021-10-25 PROCEDURE — 99213 OFFICE O/P EST LOW 20 MIN: CPT | Mod: PBBFAC,PO | Performed by: DERMATOLOGY

## 2021-10-25 PROCEDURE — 99024 PR POST-OP FOLLOW-UP VISIT: ICD-10-PCS | Mod: POP,,, | Performed by: DERMATOLOGY

## 2021-10-25 PROCEDURE — 99999 PR PBB SHADOW E&M-EST. PATIENT-LVL III: ICD-10-PCS | Mod: PBBFAC,,, | Performed by: DERMATOLOGY

## 2021-10-26 ENCOUNTER — TELEPHONE (OUTPATIENT)
Dept: DERMATOLOGY | Facility: CLINIC | Age: 86
End: 2021-10-26
Payer: MEDICARE

## 2021-11-18 ENCOUNTER — OFFICE VISIT (OUTPATIENT)
Dept: DERMATOLOGY | Facility: CLINIC | Age: 86
End: 2021-11-18
Payer: MEDICARE

## 2021-11-18 DIAGNOSIS — Z98.890 HISTORY OF MOHS MICROGRAPHIC SURGERY FOR SKIN CANCER: Primary | ICD-10-CM

## 2021-11-18 DIAGNOSIS — Z85.828 HISTORY OF MOHS MICROGRAPHIC SURGERY FOR SKIN CANCER: Primary | ICD-10-CM

## 2021-11-18 PROCEDURE — 99024 PR POST-OP FOLLOW-UP VISIT: ICD-10-PCS | Mod: POP,,, | Performed by: DERMATOLOGY

## 2021-11-18 PROCEDURE — 99024 POSTOP FOLLOW-UP VISIT: CPT | Mod: POP,,, | Performed by: DERMATOLOGY

## 2021-11-18 PROCEDURE — 99999 PR PBB SHADOW E&M-EST. PATIENT-LVL III: ICD-10-PCS | Mod: PBBFAC,,, | Performed by: DERMATOLOGY

## 2021-11-18 PROCEDURE — 99213 OFFICE O/P EST LOW 20 MIN: CPT | Mod: PBBFAC,PO | Performed by: DERMATOLOGY

## 2021-11-18 PROCEDURE — 99999 PR PBB SHADOW E&M-EST. PATIENT-LVL III: CPT | Mod: PBBFAC,,, | Performed by: DERMATOLOGY
